# Patient Record
Sex: FEMALE | Race: WHITE
[De-identification: names, ages, dates, MRNs, and addresses within clinical notes are randomized per-mention and may not be internally consistent; named-entity substitution may affect disease eponyms.]

---

## 2018-04-27 ENCOUNTER — HOSPITAL ENCOUNTER (OUTPATIENT)
Dept: HOSPITAL 53 - M LABNEURO | Age: 68
End: 2018-04-27
Attending: PSYCHIATRY & NEUROLOGY
Payer: MEDICARE

## 2018-04-27 DIAGNOSIS — Z13.88: ICD-10-CM

## 2018-04-27 DIAGNOSIS — R25.1: Primary | ICD-10-CM

## 2018-04-27 LAB
ALBUMIN/GLOBULIN RATIO: 1.31 (ref 1–1.93)
ALBUMIN: 3.8 GM/DL (ref 3.2–5.2)
ALKALINE PHOSPHATASE: 58 U/L (ref 45–117)
ALT SERPL W P-5'-P-CCNC: 14 U/L (ref 12–78)
ANION GAP: 8 MEQ/L (ref 8–16)
AST SERPL-CCNC: 17 U/L (ref 7–37)
BASO #: 0 10^3/UL (ref 0–0.2)
BASO %: 0.5 % (ref 0–1)
BILIRUBIN,TOTAL: 0.2 MG/DL (ref 0.2–1)
BLOOD UREA NITROGEN: 11 MG/DL (ref 7–18)
CALCIUM LEVEL: 8.6 MG/DL (ref 8.8–10.2)
CARBON DIOXIDE LEVEL: 26 MEQ/L (ref 21–32)
CHLORIDE LEVEL: 103 MEQ/L (ref 98–107)
CREATININE FOR GFR: 0.72 MG/DL (ref 0.55–1.3)
EOS #: 0.4 10^3/UL (ref 0–0.5)
EOSINOPHIL NFR BLD AUTO: 6.6 % (ref 0–3)
ERYTHROCYTE SEDIMENTATION RATE: 18 MM/HR (ref 0–30)
FOLATE SERPL-MCNC: 17.4 NG/ML (ref 5.4–?)
GFR SERPL CREATININE-BSD FRML MDRD: > 60 ML/MIN/{1.73_M2} (ref 45–?)
GLUCOSE, FASTING: 100 MG/DL (ref 70–100)
HEMATOCRIT: 34.7 % (ref 36–47)
HEMOGLOBIN: 11.3 G/DL (ref 12–15.5)
IMMATURE GRANULOCYTE %: 0.2 % (ref 0–3)
LYMPH #: 1.5 10^3/UL (ref 1.5–4.5)
LYMPH %: 25 % (ref 24–44)
MEAN CORPUSCULAR HEMOGLOBIN: 32.8 PG (ref 27–33)
MEAN CORPUSCULAR HGB CONC: 32.6 G/DL (ref 32–36.5)
MEAN CORPUSCULAR VOLUME: 100.9 FL (ref 80–96)
MONO #: 0.7 10^3/UL (ref 0–0.8)
MONO %: 10.8 % (ref 0–5)
NEUTROPHILS #: 3.5 10^3/UL (ref 1.8–7.7)
NEUTROPHILS %: 56.9 % (ref 36–66)
NRBC BLD AUTO-RTO: 0 % (ref 0–0)
PLATELET COUNT, AUTOMATED: 183 10^3/UL (ref 150–450)
POTASSIUM SERUM: 4 MEQ/L (ref 3.5–5.1)
RED BLOOD COUNT: 3.44 10^6/UL (ref 4–5.4)
RED CELL DISTRIBUTION WIDTH: 12.5 % (ref 11.5–14.5)
RHEUMATOID FACTOR QUANT: < 10 IU/ML (ref ?–15)
SODIUM LEVEL: 137 MEQ/L (ref 136–145)
THYROID STIMULATING HORMONE: 2.32 UIU/ML (ref 0.36–3.74)
TOTAL PROTEIN: 6.7 GM/DL (ref 6.4–8.2)
VITAMIN B12 LEVEL: 530 PG/ML (ref 247–911)
WHITE BLOOD COUNT: 6.1 10^3/UL (ref 4–10)

## 2018-04-27 PROCEDURE — 82525 ASSAY OF COPPER: CPT

## 2018-05-01 LAB
B BURGDOR IGG+IGM SER-ACNC: <0.91 ISR (ref 0–0.9)
B BURGDOR IGM SER IA-ACNC: <0.8 INDEX (ref 0–0.79)
CERULOPLASMIN SERPL-MCNC: 29.6 MG/DL (ref 19–39)
COPPER SERPL-MCNC: 126 UG/DL (ref 72–166)
DRVV CONFIRM: 39.6 SEC
DRVVT CONFIRM PPP QL: 69.6 SEC
DRVVT SCREEN TO CONFIRM RATIO: 1.6 {RATIO} (ref 0–1.2)
DSDNA AB SER-ACNC: 2 IU/ML (ref 0–9)
ENA RNP AB SER-ACNC: <0.2 AI (ref 0–0.9)
ENA SM AB SER-ACNC: <0.2 AI (ref 0–0.9)
ENA SS-B AB SER-ACNC: 1.1 AI (ref 0–0.9)
GAMMA TOCOPHEROL SERPL-MCNC: 1.9 MG/L (ref 0.5–4.9)
LEAD BLOOD ADULT: 1 UG/DL (ref 0–19)
LUPUS CONFIRM RATIO: 1
NORMALIZED RATIO: 1.6 (ref 0–1.2)
SJOGREN'S ANTI SS-A: <0.2 AI (ref 0–0.9)
VITAMIN B1 LEVEL WHOLE BLOOD: 157.4 NMOL/L (ref 66.5–200)
VITAMIN B6,PYRIDOXAL PHOSPHATE: 13.9 UG/L (ref 2–32.8)
VITAMIN E(ALPHA TOCOPHEROL): 12.1 MG/L (ref 9–29)

## 2021-02-10 ENCOUNTER — HOSPITAL ENCOUNTER (OUTPATIENT)
Dept: HOSPITAL 53 - M LABSMTC | Age: 71
End: 2021-02-10
Attending: ANESTHESIOLOGY
Payer: MEDICARE

## 2021-02-10 DIAGNOSIS — Z20.828: Primary | ICD-10-CM

## 2021-02-15 ENCOUNTER — HOSPITAL ENCOUNTER (OUTPATIENT)
Dept: HOSPITAL 53 - M OPP | Age: 71
Discharge: HOME | End: 2021-02-15
Attending: INTERNAL MEDICINE
Payer: MEDICARE

## 2021-02-15 VITALS — BODY MASS INDEX: 36.06 KG/M2 | WEIGHT: 201 LBS | HEIGHT: 62.5 IN

## 2021-02-15 VITALS — DIASTOLIC BLOOD PRESSURE: 72 MMHG | SYSTOLIC BLOOD PRESSURE: 133 MMHG

## 2021-02-15 DIAGNOSIS — Z88.6: ICD-10-CM

## 2021-02-15 DIAGNOSIS — I10: ICD-10-CM

## 2021-02-15 DIAGNOSIS — Z79.82: ICD-10-CM

## 2021-02-15 DIAGNOSIS — F32.9: ICD-10-CM

## 2021-02-15 DIAGNOSIS — R12: ICD-10-CM

## 2021-02-15 DIAGNOSIS — Z88.5: ICD-10-CM

## 2021-02-15 DIAGNOSIS — K22.8: ICD-10-CM

## 2021-02-15 DIAGNOSIS — K64.0: ICD-10-CM

## 2021-02-15 DIAGNOSIS — F41.9: ICD-10-CM

## 2021-02-15 DIAGNOSIS — D13.1: ICD-10-CM

## 2021-02-15 DIAGNOSIS — M19.90: ICD-10-CM

## 2021-02-15 DIAGNOSIS — E03.9: ICD-10-CM

## 2021-02-15 DIAGNOSIS — Z80.0: ICD-10-CM

## 2021-02-15 DIAGNOSIS — K44.9: ICD-10-CM

## 2021-02-15 DIAGNOSIS — K31.89: ICD-10-CM

## 2021-02-15 DIAGNOSIS — Z91.041: ICD-10-CM

## 2021-02-15 DIAGNOSIS — Z88.1: ICD-10-CM

## 2021-02-15 DIAGNOSIS — J44.9: ICD-10-CM

## 2021-02-15 DIAGNOSIS — Z12.11: Primary | ICD-10-CM

## 2021-02-15 DIAGNOSIS — Z87.891: ICD-10-CM

## 2021-02-15 DIAGNOSIS — Z79.899: ICD-10-CM

## 2021-02-15 DIAGNOSIS — Z88.8: ICD-10-CM

## 2021-02-15 PROCEDURE — 88305 TISSUE EXAM BY PATHOLOGIST: CPT

## 2021-02-15 PROCEDURE — 43239 EGD BIOPSY SINGLE/MULTIPLE: CPT

## 2021-02-15 NOTE — ROOR
________________________________________________________________________________

Patient Name: Ada Roberts          Procedure Date: 2/15/2021 11:59 AM

MRN: [id number]                          Account Number: [id number]

YOB: 1950                Age: 70

Room: [facility]                            Gender: Female

Note Status: Finalized                 

________________________________________________________________________________

 

Procedure:            Upper Endoscopy + Biopsies

Indications:          Heartburn, Exclusion of Turk's esophagus

Providers:            Monty Flores MD

Referring MD:         Larissa Paulino

Requestasya Provider:  

Medicines:            Monitored Anesthesia Care

Complications:        No immediate complications.

________________________________________________________________________________

Procedure:            Pre-Anesthesia Assessment:

                      - The heart rate, respiratory rate, oxygen saturations, 

                      blood pressure, adequacy of pulmonary ventilation, and 

                      response to care were monitored throughout the procedure.

                      The Endoscope was introduced through the mouth, and 

                      advanced to the second part of duodenum. The upper GI 

                      endoscopy was accomplished without difficulty. The 

                      patient tolerated the procedure well.

                                                                                

Findings:

     The Z-line was irregular and was found 35 cm from the incisors. Multiple 

     biopsies were obtained with cold forceps for evaluation to rule out 

     Turk's Esophagus randomly at the gastroesophageal junction.

     A small hiatal hernia was present.

     Localized mildly erythematous mucosa without bleeding was found in the 

     gastric antrum. Biopsies were taken with a cold forceps for Helicobacter 

     pylori testing.

     The exam of the duodenum was otherwise normal.

                                                                                

Impression:           - Z-line irregular, 35 cm from the incisors.

                      - Small hiatal hernia.

                      - Erythematous mucosa in the antrum. Biopsied.

                      - Multiple biopsies were obtained at the 

                      gastroesophageal junction.

                      - The examination was otherwise normal.

Recommendation:       - Patient has a contact number available for 

                      emergencies. The signs and symptoms of potential delayed 

                      complications were discussed with the patient. Return to 

                      normal activities tomorrow. Written discharge 

                      instructions were provided to the patient.

                      - High fiber diet.

                      - Discharge patient to home.

                      - Follow an antireflux regimen.

                      - Continue present medications.

                      - Await pathology results.

                      - Telephone GI clinic for pathology results in 1 week.

                      - Return to referring physician.

                      - The findings and recommendations were discussed with 

                      the patient.

                                                                                

Procedure Code(s):    --- Professional ---

                      04484, Esophagogastroduodenoscopy, flexible, transoral; 

                      with biopsy, single or multiple

Diagnosis Code(s):    --- Professional ---

                      K22.8, Other specified diseases of esophagus

                      K44.9, Diaphragmatic hernia without obstruction or 

                      gangrene

                      K31.89, Other diseases of stomach and duodenum

                      R12, Heartburn

 

CPT copyright 2019 [employer]. All rights reserved.

 

The codes documented in this report are preliminary and upon  review may 

be revised to meet current compliance requirements.

 

Monty Flores MD

____________________

Monty Flores MD

2/15/2021 12:13:13 PM

Electronically signed by Monty Flores MD

Number of Addenda: 0

 

Note Initiated On: 2/15/2021 11:59 AM

Estimated Blood Loss: Estimated blood loss: none.

## 2021-02-15 NOTE — ROOR
________________________________________________________________________________

Patient Name: Ada Roberts          Procedure Date: 2/15/2021 12:01 PM

MRN: [id number]                          Account Number: [id number]

YOB: 1950                Age: 70

Room: [facility]                            Gender: Female

Note Status: Finalized                 

________________________________________________________________________________

 

Procedure:            Total Colonoscopy to Cecum

Indications:          Colon cancer screening in patient at increased risk: 

                      Colorectal cancer in father

Providers:            Monty Flores MD

Referring MD:         Larissa Paulino

Requesting Provider:  

Medicines:            Monitored Anesthesia Care

Complications:        No immediate complications.

________________________________________________________________________________

Procedure:            Pre-Anesthesia Assessment:

                      - The heart rate, respiratory rate, oxygen saturations, 

                      blood pressure, adequacy of pulmonary ventilation, and 

                      response to care were monitored throughout the procedure.

                      The Colonoscope was introduced through the anus and 

                      advanced to the cecum, identified by appendiceal orifice 

                      and ileocecal valve. The colonoscopy was performed 

                      without difficulty. The patient tolerated the procedure 

                      well. The quality of the bowel preparation was good.

                                                                                

Findings:

     The perianal and digital rectal examinations were normal.

     Non-bleeding internal hemorrhoids were found during retroflexion. The 

     hemorrhoids were small and Grade I (internal hemorrhoids that do not 

     prolapse).

     No other significant abnormalities were identified in a careful 

     examination of the remainder of the colon.

     The exam was otherwise without abnormality on direct and retroflexion 

     views.

                                                                                

Impression:           - Non-bleeding internal hemorrhoids.

                      - The examination was otherwise normal on direct and 

                      retroflexion views.

                      - No specimens collected.

                      - The exam was otherwise normal to the cecum.

Recommendation:       - Patient has a contact number available for 

                      emergencies. The signs and symptoms of potential delayed 

                      complications were discussed with the patient. Return to 

                      normal activities tomorrow. Written discharge 

                      instructions were provided to the patient.

                      - High fiber diet.

                      - Discharge patient to home.

                      - Continue present medications.

                      - Await pathology results.

                      - Telephone GI clinic for pathology results in 1 week.

                      - Return to referring physician.

                      - Repeat colonoscopy in 5 years for screening purposes.

                      - The findings and recommendations were discussed with 

                      the patient.

                                                                                

Procedure Code(s):    --- Professional ---

                      86785, Colonoscopy, flexible; diagnostic, including 

                      collection of specimen(s) by brushing or washing, when 

                      performed (separate procedure)

Diagnosis Code(s):    --- Professional ---

                      Z80.0, Family history of malignant neoplasm of digestive 

                      organs

                      K64.0, First degree hemorrhoids

 

CPT copyright 2019 [employer]. All rights reserved.

 

The codes documented in this report are preliminary and upon  review may 

be revised to meet current compliance requirements.

 

Monty Flores MD

____________________

Monty Flores MD

2/15/2021 12:27:39 PM

Electronically signed by Mnoty Flores MD

Number of Addenda: 0

 

Note Initiated On: 2/15/2021 12:01 PM

Estimated Blood Loss: Estimated blood loss: none.